# Patient Record
Sex: MALE | Race: BLACK OR AFRICAN AMERICAN | URBAN - METROPOLITAN AREA
[De-identification: names, ages, dates, MRNs, and addresses within clinical notes are randomized per-mention and may not be internally consistent; named-entity substitution may affect disease eponyms.]

---

## 2017-06-22 ENCOUNTER — FOLLOW UP ESTABLISHED (OUTPATIENT)
Dept: URBAN - METROPOLITAN AREA CLINIC 10 | Facility: CLINIC | Age: 77
End: 2017-06-22
Payer: MEDICARE

## 2017-06-22 DIAGNOSIS — H25.813 COMBINED FORMS OF AGE-RELATED CATARACT, BILATERAL: ICD-10-CM

## 2017-06-22 DIAGNOSIS — H40.1132 PRIMARY OPEN-ANGLE GLAUCOMA, MODERATE STAGE, BILATERAL: Primary | ICD-10-CM

## 2017-06-22 PROCEDURE — 92014 COMPRE OPH EXAM EST PT 1/>: CPT | Performed by: OPTOMETRIST

## 2017-06-22 PROCEDURE — 92083 EXTENDED VISUAL FIELD XM: CPT | Performed by: OPTOMETRIST

## 2017-06-22 ASSESSMENT — INTRAOCULAR PRESSURE
OD: 28
OS: 22

## 2018-08-14 ENCOUNTER — FOLLOW UP ESTABLISHED (OUTPATIENT)
Dept: URBAN - METROPOLITAN AREA CLINIC 10 | Facility: CLINIC | Age: 78
End: 2018-08-14
Payer: MEDICARE

## 2018-08-14 PROCEDURE — 92014 COMPRE OPH EXAM EST PT 1/>: CPT | Performed by: OPTOMETRIST

## 2018-08-14 RX ORDER — TIMOLOL MALEATE 5 MG/ML
0.5 % SOLUTION/ DROPS OPHTHALMIC
Qty: 1 | Refills: 11 | Status: INACTIVE
Start: 2018-08-14 | End: 2018-09-05

## 2018-08-14 ASSESSMENT — INTRAOCULAR PRESSURE
OD: 30
OS: 22

## 2018-08-14 ASSESSMENT — VISUAL ACUITY
OS: 20/30
OD: 20/30

## 2018-10-24 ENCOUNTER — OFFICE VISIT (OUTPATIENT)
Dept: URBAN - METROPOLITAN AREA CLINIC 23 | Facility: CLINIC | Age: 78
End: 2018-10-24
Payer: MEDICARE

## 2018-10-24 DIAGNOSIS — H40.1133 PRIMARY OPEN-ANGLE GLAUCOMA, BILATERAL, SEVERE STAGE: Primary | ICD-10-CM

## 2018-10-24 PROCEDURE — 76514 ECHO EXAM OF EYE THICKNESS: CPT | Performed by: OPHTHALMOLOGY

## 2018-10-24 PROCEDURE — 92004 COMPRE OPH EXAM NEW PT 1/>: CPT | Performed by: OPHTHALMOLOGY

## 2018-10-24 PROCEDURE — 92020 GONIOSCOPY: CPT | Performed by: OPHTHALMOLOGY

## 2018-10-24 PROCEDURE — 92133 CPTRZD OPH DX IMG PST SGM ON: CPT | Performed by: OPHTHALMOLOGY

## 2018-10-24 RX ORDER — NETARSUDIL 0.2 MG/ML
0.02 % SOLUTION/ DROPS OPHTHALMIC; TOPICAL
Qty: 1 | Refills: 11 | Status: INACTIVE
Start: 2018-10-24 | End: 2018-11-06

## 2018-10-24 ASSESSMENT — INTRAOCULAR PRESSURE
OS: 34
OS: 23
OD: 36
OD: 30

## 2018-10-24 NOTE — IMPRESSION/PLAN
Impression: Primary open-angle glaucoma, bilateral, severe stage: U55.4189. Trab OS-CCT average OU - ONH Enlarged cupping OU-
IOP elevated OU - medication not effective at lowering IOP - Plan: Discussed diagnosis, explained and understood by patient. Discussed IOP/ONH/Glaucoma management and risks. OCT ordered, performed and reviewed. Continue timolol bid ou. Start rhopressa qhs od, sample given. Discussed side effects of glaucoma meds. Recommends Micropulse Trans-scleral CPC OD laser if rhopressa not effective at lowering IOP. will continue to monitor condition and symptoms.

## 2019-08-29 ENCOUNTER — FOLLOW UP ESTABLISHED (OUTPATIENT)
Dept: URBAN - METROPOLITAN AREA CLINIC 10 | Facility: CLINIC | Age: 79
End: 2019-08-29
Payer: MEDICARE

## 2019-08-29 PROCEDURE — 92083 EXTENDED VISUAL FIELD XM: CPT | Performed by: OPTOMETRIST

## 2019-08-29 PROCEDURE — 92133 CPTRZD OPH DX IMG PST SGM ON: CPT | Performed by: OPTOMETRIST

## 2019-08-29 PROCEDURE — 92014 COMPRE OPH EXAM EST PT 1/>: CPT | Performed by: OPTOMETRIST

## 2019-08-29 ASSESSMENT — VISUAL ACUITY
OD: 20/40
OS: 20/30

## 2019-08-29 ASSESSMENT — INTRAOCULAR PRESSURE
OS: 22
OD: 31

## 2019-08-29 ASSESSMENT — KERATOMETRY
OD: 46.13
OS: 46.13

## 2019-11-04 ENCOUNTER — FOLLOW UP ESTABLISHED (OUTPATIENT)
Dept: URBAN - METROPOLITAN AREA CLINIC 10 | Facility: CLINIC | Age: 79
End: 2019-11-04
Payer: MEDICARE

## 2019-11-04 PROCEDURE — 92014 COMPRE OPH EXAM EST PT 1/>: CPT | Performed by: OPHTHALMOLOGY

## 2019-11-04 PROCEDURE — 92083 EXTENDED VISUAL FIELD XM: CPT | Performed by: OPHTHALMOLOGY

## 2019-11-04 PROCEDURE — 76514 ECHO EXAM OF EYE THICKNESS: CPT | Performed by: OPHTHALMOLOGY

## 2019-11-04 ASSESSMENT — INTRAOCULAR PRESSURE
OD: 36
OS: 26

## 2019-11-18 ENCOUNTER — Encounter (OUTPATIENT)
Dept: URBAN - METROPOLITAN AREA CLINIC 10 | Facility: CLINIC | Age: 79
End: 2019-11-18
Payer: MEDICARE

## 2019-11-18 PROCEDURE — 65855 TRABECULOPLASTY LASER SURG: CPT | Performed by: OPHTHALMOLOGY

## 2019-12-02 ENCOUNTER — Encounter (OUTPATIENT)
Dept: URBAN - METROPOLITAN AREA CLINIC 10 | Facility: CLINIC | Age: 79
End: 2019-12-02
Payer: MEDICARE

## 2019-12-02 PROCEDURE — 65855 TRABECULOPLASTY LASER SURG: CPT | Performed by: OPHTHALMOLOGY

## 2019-12-02 ASSESSMENT — INTRAOCULAR PRESSURE: OS: 32

## 2019-12-30 ENCOUNTER — FOLLOW UP ESTABLISHED (OUTPATIENT)
Dept: URBAN - METROPOLITAN AREA CLINIC 10 | Facility: CLINIC | Age: 79
End: 2019-12-30
Payer: MEDICARE

## 2019-12-30 PROCEDURE — 92012 INTRM OPH EXAM EST PATIENT: CPT | Performed by: OPHTHALMOLOGY

## 2019-12-30 ASSESSMENT — INTRAOCULAR PRESSURE
OD: 27
OS: 26

## 2020-01-27 ENCOUNTER — Encounter (OUTPATIENT)
Dept: URBAN - METROPOLITAN AREA CLINIC 10 | Facility: CLINIC | Age: 80
End: 2020-01-27
Payer: MEDICARE

## 2020-01-27 PROCEDURE — 65855 TRABECULOPLASTY LASER SURG: CPT | Performed by: OPHTHALMOLOGY

## 2020-02-03 ENCOUNTER — Encounter (OUTPATIENT)
Dept: URBAN - METROPOLITAN AREA CLINIC 10 | Facility: CLINIC | Age: 80
End: 2020-02-03
Payer: MEDICARE

## 2020-02-03 PROCEDURE — 65855 TRABECULOPLASTY LASER SURG: CPT | Performed by: OPHTHALMOLOGY

## 2020-02-24 ENCOUNTER — FOLLOW UP ESTABLISHED (OUTPATIENT)
Dept: URBAN - METROPOLITAN AREA CLINIC 10 | Facility: CLINIC | Age: 80
End: 2020-02-24
Payer: MEDICARE

## 2020-02-24 PROCEDURE — 92012 INTRM OPH EXAM EST PATIENT: CPT | Performed by: OPTOMETRIST

## 2020-02-24 RX ORDER — NEOMYCIN SULFATE, POLYMYXIN B SULFATE AND DEXAMETHASONE 3.5; 10000; 1 MG/ML; [USP'U]/ML; MG/ML
SUSPENSION OPHTHALMIC
Qty: 1 | Refills: 1 | Status: INACTIVE
Start: 2020-02-24 | End: 2020-08-10

## 2020-02-24 ASSESSMENT — INTRAOCULAR PRESSURE: OD: 17

## 2020-03-03 ENCOUNTER — FOLLOW UP ESTABLISHED (OUTPATIENT)
Dept: URBAN - METROPOLITAN AREA CLINIC 10 | Facility: CLINIC | Age: 80
End: 2020-03-03
Payer: MEDICARE

## 2020-03-03 DIAGNOSIS — B00.59 OTHER HERPESVIRAL DISEASE OF EYE: ICD-10-CM

## 2020-03-03 PROCEDURE — 92012 INTRM OPH EXAM EST PATIENT: CPT | Performed by: OPTOMETRIST

## 2020-03-03 ASSESSMENT — INTRAOCULAR PRESSURE
OD: 26
OS: 26
OS: 21
OD: 23

## 2020-03-23 ENCOUNTER — FOLLOW UP ESTABLISHED (OUTPATIENT)
Dept: URBAN - METROPOLITAN AREA CLINIC 10 | Facility: CLINIC | Age: 80
End: 2020-03-23
Payer: MEDICARE

## 2020-03-23 PROCEDURE — 92014 COMPRE OPH EXAM EST PT 1/>: CPT | Performed by: OPHTHALMOLOGY

## 2020-03-23 ASSESSMENT — INTRAOCULAR PRESSURE
OS: 17
OD: 28

## 2020-04-20 ENCOUNTER — FOLLOW UP ESTABLISHED (OUTPATIENT)
Dept: URBAN - METROPOLITAN AREA CLINIC 10 | Facility: CLINIC | Age: 80
End: 2020-04-20
Payer: MEDICARE

## 2020-04-20 PROCEDURE — 92012 INTRM OPH EXAM EST PATIENT: CPT | Performed by: OPHTHALMOLOGY

## 2020-04-20 ASSESSMENT — INTRAOCULAR PRESSURE
OD: 33
OS: 19

## 2020-05-18 ENCOUNTER — FOLLOW UP ESTABLISHED (OUTPATIENT)
Dept: URBAN - METROPOLITAN AREA CLINIC 10 | Facility: CLINIC | Age: 80
End: 2020-05-18
Payer: MEDICARE

## 2020-05-18 PROCEDURE — 92012 INTRM OPH EXAM EST PATIENT: CPT | Performed by: OPHTHALMOLOGY

## 2020-05-18 RX ORDER — BRINZOLAMIDE/BRIMONIDINE TARTRATE 10; 2 MG/ML; MG/ML
SUSPENSION/ DROPS OPHTHALMIC
Qty: 0 | Refills: 0 | Status: INACTIVE
Start: 2020-05-18 | End: 2020-05-19

## 2020-05-18 ASSESSMENT — INTRAOCULAR PRESSURE
OD: 17
OS: 17

## 2020-08-10 ENCOUNTER — FOLLOW UP ESTABLISHED (OUTPATIENT)
Dept: URBAN - METROPOLITAN AREA CLINIC 10 | Facility: CLINIC | Age: 80
End: 2020-08-10
Payer: MEDICARE

## 2020-08-10 PROCEDURE — 92012 INTRM OPH EXAM EST PATIENT: CPT | Performed by: OPHTHALMOLOGY

## 2020-08-10 RX ORDER — DORZOLAMIDE HCL 20 MG/ML
2 % SOLUTION/ DROPS OPHTHALMIC
Qty: 3 | Refills: 3 | Status: INACTIVE
Start: 2020-08-10 | End: 2020-11-30

## 2020-08-10 RX ORDER — TIMOLOL MALEATE 5 MG/ML
0.5 % SOLUTION/ DROPS OPHTHALMIC
Qty: 3 | Refills: 3 | Status: INACTIVE
Start: 2020-08-10 | End: 2021-03-22

## 2020-08-10 RX ORDER — BRIMONIDINE TARTRATE 2 MG/ML
0.2 % SOLUTION/ DROPS OPHTHALMIC
Qty: 3 | Refills: 3 | Status: INACTIVE
Start: 2020-08-10 | End: 2020-11-30

## 2020-08-10 ASSESSMENT — VISUAL ACUITY
OD: 20/30
OS: 20/40

## 2020-08-10 ASSESSMENT — INTRAOCULAR PRESSURE
OD: 20
OS: 17

## 2020-10-05 ENCOUNTER — FOLLOW UP ESTABLISHED (OUTPATIENT)
Dept: URBAN - METROPOLITAN AREA CLINIC 10 | Facility: CLINIC | Age: 80
End: 2020-10-05
Payer: MEDICARE

## 2020-10-05 PROCEDURE — 92012 INTRM OPH EXAM EST PATIENT: CPT | Performed by: OPHTHALMOLOGY

## 2020-10-05 ASSESSMENT — INTRAOCULAR PRESSURE
OD: 25
OS: 20

## 2020-11-30 ENCOUNTER — FOLLOW UP ESTABLISHED (OUTPATIENT)
Dept: URBAN - METROPOLITAN AREA CLINIC 10 | Facility: CLINIC | Age: 80
End: 2020-11-30
Payer: MEDICARE

## 2020-11-30 PROCEDURE — 92012 INTRM OPH EXAM EST PATIENT: CPT | Performed by: OPHTHALMOLOGY

## 2020-11-30 RX ORDER — BRINZOLAMIDE/BRIMONIDINE TARTRATE 10; 2 MG/ML; MG/ML
SUSPENSION/ DROPS OPHTHALMIC
Qty: 15 | Refills: 0 | Status: INACTIVE
Start: 2020-11-30 | End: 2020-11-30

## 2020-11-30 ASSESSMENT — INTRAOCULAR PRESSURE
OD: 20
OS: 21

## 2021-03-22 ENCOUNTER — FOLLOW UP ESTABLISHED (OUTPATIENT)
Dept: URBAN - METROPOLITAN AREA CLINIC 10 | Facility: CLINIC | Age: 81
End: 2021-03-22
Payer: MEDICARE

## 2021-03-22 PROCEDURE — 99213 OFFICE O/P EST LOW 20 MIN: CPT | Performed by: OPHTHALMOLOGY

## 2021-03-22 RX ORDER — BRINZOLAMIDE/BRIMONIDINE TARTRATE 10; 2 MG/ML; MG/ML
SUSPENSION/ DROPS OPHTHALMIC
Qty: 16 | Refills: 3 | Status: ACTIVE
Start: 2021-03-22

## 2021-03-22 RX ORDER — TIMOLOL MALEATE 5 MG/ML
0.5 % SOLUTION/ DROPS OPHTHALMIC
Qty: 15 | Refills: 3 | Status: INACTIVE
Start: 2021-03-22 | End: 2022-03-25

## 2021-03-22 ASSESSMENT — INTRAOCULAR PRESSURE
OS: 19
OD: 23

## 2021-05-28 ENCOUNTER — OFFICE VISIT (OUTPATIENT)
Dept: URBAN - METROPOLITAN AREA CLINIC 10 | Facility: CLINIC | Age: 81
End: 2021-05-28
Payer: MEDICARE

## 2021-05-28 PROCEDURE — 99213 OFFICE O/P EST LOW 20 MIN: CPT | Performed by: OPHTHALMOLOGY

## 2021-05-28 ASSESSMENT — INTRAOCULAR PRESSURE
OS: 20
OD: 24

## 2021-05-28 NOTE — IMPRESSION/PLAN
Impression: Primary open-angle glaucoma, moderate stage, bilateral
s/p SLT OD on 11/18/19 and OS on 12/02/19
steroid Response 2/2 Maxitrol due to Shingles Previously used medications : Rhopressa - Intolerance;  Brimonidine + Dorzolamide - tried but had side effects of dizziness Plan: Pt has Glaucoma    Gonio :  ss 2-3+ pig OU   Pachs:  560/540   Today's IOP :  24, 20  Tmax :  36,34 Target IOP low to mid teens
not surgical candidate* Pt denies Fhx of Glaucoma Right eye is the better seeing eye  
11/4/19 HVF ( bilateral inferior jennifer fields )
C/D: 0.65x0.65 thin superior temporal  /0.95x0.9 loss of inferior and temporal 
Pt denies Sulfa Allergy   // Pt confirms Heart dx Plan :
1. Cont:
Timolol QAM OU Simbrinza QHS OU Pt states he tolerated Simbrinza but not generics. Patient has failed all other combinations of drops. IOP is not controlled with just one medication. Will need to do  prior auth for [address]. 2.  Explained that current condition and IOP stable with current medication. Careful observation was discussed and is strongly recommended to prevent possible future vision loss. Will continue to monitor interocular pressure and testing in clinic at regular intervals. No additional treatment is being implemented at this time. Pt has deferred surgical intervention 11/30/2020-05/28/2021 3.  Return in 3 months for [facility] (tech to check IOP)

## 2021-05-28 NOTE — IMPRESSION/PLAN
Impression: Combined forms of age-related cataract, bilateral Plan: Due to health issues recommend holding for now. Pt defers MRx.

## 2021-11-12 ENCOUNTER — OFFICE VISIT (OUTPATIENT)
Dept: URBAN - METROPOLITAN AREA CLINIC 10 | Facility: CLINIC | Age: 81
End: 2021-11-12
Payer: MEDICARE

## 2021-11-12 PROCEDURE — 99213 OFFICE O/P EST LOW 20 MIN: CPT | Performed by: OPHTHALMOLOGY

## 2021-11-12 ASSESSMENT — INTRAOCULAR PRESSURE
OS: 22
OD: 22

## 2021-11-12 NOTE — IMPRESSION/PLAN
Impression: Primary open-angle glaucoma, moderate stage, bilateral
s/p SLT OD on 11/18/19 and OS on 12/02/19
steroid Response 2/2 Maxitrol due to Shingles Previously used medications : Rhopressa - Intolerance;  Brimonidine + Dorzolamide - tried but had side effects of dizziness Plan: Pt has Glaucoma    Gonio :  ss 2-3+ pig OU   Pachs:  560/540   Today's IOP :  22 OU  Tmax :  36,34 Target IOP low to mid teens
not surgical candidate* Pt denies Fhx of Glaucoma Right eye is the better seeing eye  
11/4/19 HVF ( bilateral inferior jennifer fields )
C/D: 0.65x0.65 thin superior temporal  /0.95x0.9 loss of inferior and temporal 
Pt denies Sulfa Allergy   // Pt confirms Heart dx Plan :
1. Cont:
Timolol QAM OU Simbrinza QHS OU 2. Explained that current condition and IOP stable with current medication. Careful observation was discussed and is strongly recommended to prevent possible future vision loss. Will continue to monitor interocular pressure and testing in clinic at regular intervals. No additional treatment is being implemented at this time. Pt has deferred surgical intervention 11/30/2020-05/28/2021 3.  Return in 3-4 months for IOP check

## 2022-03-04 ENCOUNTER — OFFICE VISIT (OUTPATIENT)
Dept: URBAN - METROPOLITAN AREA CLINIC 10 | Facility: CLINIC | Age: 82
End: 2022-03-04
Payer: MEDICARE

## 2022-03-04 PROCEDURE — 99213 OFFICE O/P EST LOW 20 MIN: CPT | Performed by: OPHTHALMOLOGY

## 2022-03-04 ASSESSMENT — INTRAOCULAR PRESSURE
OD: 29
OS: 20

## 2022-03-04 NOTE — IMPRESSION/PLAN
Impression: Primary open-angle glaucoma, moderate stage, bilateral
s/p SLT OD on 11/18/19 and OS on 12/02/19
steroid Response 2/2 Maxitrol due to Shingles Previously used medications : Rhopressa - Intolerance;  Brimonidine + Dorzolamide - tried but had side effects of dizziness Plan: Pt has Glaucoma    Gonio :  ss 2-3+ pig OU   Pachs:  560/540   Today's IOP :  29/20   Tmax :  36,34 Target IOP low to mid teens
not surgical candidate* Pt denies Fhx of Glaucoma Right eye is the better seeing eye  
11/4/19 HVF ( bilateral inferior jennifer fields )
C/D: 0.65x0.65 thin superior temporal  /0.95x0.9 loss of inferior and temporal 
Pt denies Sulfa Allergy // Pt confirms Heart dx Plan :
1. Continue:
Timolol QAM OU Simbrinza QHS OU 2. Explained that current condition and IOP stable with current medication. Careful observation was discussed and is strongly recommended to prevent possible future vision loss. Will continue to monitor interocular pressure and testing in clinic at regular intervals. No additional treatment is being implemented at this time 2/2 deferred additional intervention. Pt has deferred surgical intervention 11/30/2020-03/04/22 3. Return in 1 months for IOP check for MRX with optom and 4 months with Dr. Elaine Ramírez with [facility]
4. Pt to call sooner if he is cleared for surgical intervention and wants to have it performed Would plan for cat/OMNI/HYdrus OU

## 2022-06-24 ENCOUNTER — OFFICE VISIT (OUTPATIENT)
Dept: URBAN - METROPOLITAN AREA CLINIC 10 | Facility: CLINIC | Age: 82
End: 2022-06-24
Payer: MEDICARE

## 2022-06-24 DIAGNOSIS — H25.813 COMBINED FORMS OF AGE-RELATED CATARACT, BILATERAL: ICD-10-CM

## 2022-06-24 DIAGNOSIS — H40.1132 PRIMARY OPEN-ANGLE GLAUCOMA, BILATERAL, MODERATE STAGE: Primary | ICD-10-CM

## 2022-06-24 PROCEDURE — 99213 OFFICE O/P EST LOW 20 MIN: CPT | Performed by: OPHTHALMOLOGY

## 2022-06-24 ASSESSMENT — INTRAOCULAR PRESSURE
OS: 21
OD: 28

## 2022-06-24 NOTE — IMPRESSION/PLAN
Impression: Primary open-angle glaucoma, moderate stage, bilateral
s/p SLT OD on 11/18/19 and OS on 12/02/19
steroid Response 2/2 Maxitrol due to Shingles Previously used medications : Rhopressa - Intolerance;  Brimonidine + Dorzolamide - tried but had side effects of dizziness Plan: Pt has Glaucoma    Gonio :  ss 2-3+ pig OU   Pachs:  560/540   Today's IOP :  28/21   Tmax :  36,34 Target IOP low to mid teens
not surgical candidate* Pt denies Fhx of Glaucoma Right eye is the better seeing eye  
11/4/19 HVF ( bilateral inferior jennifer fields )
C/D: 0.65x0.65 thin superior temporal  /0.95x0.9 loss of inferior and temporal 
Pt denies Sulfa Allergy // Pt confirms Heart dx Plan :
1. Continue:
Timolol BID OU Simbrinza BID OU 2. Explained that current condition and IOP stable with current medication. Careful observation was discussed and is strongly recommended to prevent possible future vision loss. Will continue to monitor interocular pressure and testing in clinic at regular intervals. No additional treatment is being implemented at this time 2/2 deferred additional intervention. Pt has deferred surgical intervention 11/30/2020-6/24/22 3. Return in 4 months for CE &IOP check for MRX with [facility].  Med management (Return to Dr. Carmen Su if surgical intervention is desired)

## 2022-06-24 NOTE — IMPRESSION/PLAN
Impression: Combined forms of age-related cataract, bilateral Plan: Due to health issues recommend holding for now.  Pt defers cataract surgery

## 2023-04-20 ENCOUNTER — OFFICE VISIT (OUTPATIENT)
Dept: URBAN - METROPOLITAN AREA CLINIC 10 | Facility: CLINIC | Age: 83
End: 2023-04-20
Payer: MEDICARE

## 2023-04-20 DIAGNOSIS — H25.813 COMBINED FORMS OF AGE-RELATED CATARACT, BILATERAL: ICD-10-CM

## 2023-04-20 DIAGNOSIS — H43.393 OTHER VITREOUS OPACITIES, BILATERAL: ICD-10-CM

## 2023-04-20 DIAGNOSIS — H40.1132 PRIMARY OPEN-ANGLE GLAUCOMA, BILATERAL, MODERATE STAGE: Primary | ICD-10-CM

## 2023-04-20 PROCEDURE — 92133 CPTRZD OPH DX IMG PST SGM ON: CPT | Performed by: OPTOMETRIST

## 2023-04-20 PROCEDURE — 99204 OFFICE O/P NEW MOD 45 MIN: CPT | Performed by: OPTOMETRIST

## 2023-04-20 PROCEDURE — 92134 CPTRZ OPH DX IMG PST SGM RTA: CPT | Performed by: OPTOMETRIST

## 2023-04-20 ASSESSMENT — VISUAL ACUITY
OS: 20/60
OD: 20/100

## 2023-04-20 ASSESSMENT — INTRAOCULAR PRESSURE
OS: 21
OD: 35
OD: 30
OS: 30

## 2023-04-20 NOTE — IMPRESSION/PLAN
Impression: Other vitreous opacities, bilateral: H43.393. Plan: Discussed findings in detail. Discussed signs and symptoms of RD and RTC STAT if noticed. Asteroid hyalosis OS.

## 2023-04-20 NOTE — IMPRESSION/PLAN
Impression: Combined forms of age-related cataract, bilateral Plan: Due to health issues recommend holding for now. Pt defers cataract surgery Understands no improvement c glasses. Pt. will discuss c spouse.

## 2023-04-20 NOTE — IMPRESSION/PLAN
Impression: Primary open-angle glaucoma, moderate stage, bilateral
s/p SLT OD on 11/18/19 and OS on 12/02/19
s/p trab OS
steroid Response 2/2 Maxitrol due to Shingles Previously used medications : Rhopressa - Intolerance;  Brimonidine + Dorzolamide - tried but had side effects of dizziness Plan: Pt has Glaucoma    Gonio :  ss 2-3+ pig OU   Pachs:  560/540    Tmax :  36,34 Target IOP low to mid teens
not surgical candidate* Pt denies Fhx of Glaucoma Right eye is the better seeing eye  
11/4/19 HVF ( bilateral inferior jennifer fields )
C/D: 0.65x0.65 thin superior temporal  /0.95x0.9 loss of inferior and temporal 
Pt denies Sulfa Allergy // Pt confirms Heart dx Plan :
1. IOP sub-optimal OD>OS. Pt. is not using simbrinza OD so will restart. Cont. Timolol BID OU Restart Simbrinza BID OD and cont. bid OS. 2. Pt. continues to defer additional intervention. Pt has deferred surgical intervention 11/30/2020-4/20/23 3. RTC 1 month for IOP check.

## 2023-05-25 ENCOUNTER — OFFICE VISIT (OUTPATIENT)
Dept: URBAN - METROPOLITAN AREA CLINIC 10 | Facility: CLINIC | Age: 83
End: 2023-05-25
Payer: MEDICARE

## 2023-05-25 DIAGNOSIS — H40.1132 PRIMARY OPEN-ANGLE GLAUCOMA, BILATERAL, MODERATE STAGE: Primary | ICD-10-CM

## 2023-05-25 DIAGNOSIS — H25.813 COMBINED FORMS OF AGE-RELATED CATARACT, BILATERAL: ICD-10-CM

## 2023-05-25 PROCEDURE — 99213 OFFICE O/P EST LOW 20 MIN: CPT | Performed by: OPTOMETRIST

## 2023-05-25 ASSESSMENT — INTRAOCULAR PRESSURE
OS: 21
OS: 20
OD: 22
OD: 29

## 2023-05-25 NOTE — IMPRESSION/PLAN
Impression: Primary open-angle glaucoma, moderate stage, bilateral
s/p SLT OD on 11/18/19 and OS on 12/02/19
s/p trab OS
steroid Response 2/2 Maxitrol due to Shingles Previously used medications : Rhopressa - Intolerance;  Brimonidine + Dorzolamide - tried but had side effects of dizziness
intolerant to PGAs Plan: Pt has Glaucoma    Gonio :  ss 2-3+ pig OU   Pachs:  560/540    Tmax :  36,34 Target IOP low to mid teens
not surgical candidate* Pt denies Fhx of Glaucoma Right eye is the better seeing eye  
11/4/19 HVF ( bilateral inferior jennifer fields )
C/D: 0.65x0.65 thin superior temporal  /0.95x0.9 loss of inferior and temporal 
Pt denies Sulfa Allergy // Pt confirms Heart dx Plan :
1. IOP remains sub-optimal OU Cont. simbrinza bid OU. Cont. Timolol BID OU Intolerant to all other gtts. 2. Pt. continues to defer additional intervention. Pt has deferred surgical intervention 11/30/2020-4/20/23. Discussed c Dr. Lakshmi Blount in the past.
3. Possible repeat SLT.   Will consult Dr. Lexi Huynh 1 month

## 2023-06-23 ENCOUNTER — OFFICE VISIT (OUTPATIENT)
Dept: URBAN - METROPOLITAN AREA CLINIC 10 | Facility: CLINIC | Age: 83
End: 2023-06-23
Payer: MEDICARE

## 2023-06-23 DIAGNOSIS — H16.223 KERATOCONJUNCTIVITIS SICCA, BILATERAL: ICD-10-CM

## 2023-06-23 DIAGNOSIS — H40.1133 PRIMARY OPEN-ANGLE GLAUCOMA, SEVERE STAGE, BILATERAL: Primary | ICD-10-CM

## 2023-06-23 DIAGNOSIS — H25.813 COMBINED FORMS OF AGE-RELATED CATARACT, BILATERAL: ICD-10-CM

## 2023-06-23 PROCEDURE — 92020 GONIOSCOPY: CPT | Performed by: STUDENT IN AN ORGANIZED HEALTH CARE EDUCATION/TRAINING PROGRAM

## 2023-06-23 PROCEDURE — 99214 OFFICE O/P EST MOD 30 MIN: CPT | Performed by: STUDENT IN AN ORGANIZED HEALTH CARE EDUCATION/TRAINING PROGRAM

## 2023-06-23 ASSESSMENT — INTRAOCULAR PRESSURE
OD: 30
OS: 22

## 2023-06-23 NOTE — IMPRESSION/PLAN
Impression: Primary open-angle glaucoma, severe stage, bilateral: H40.1133. *Steroid responder* Hx of multiple concussions Intolerance to Rho & PGA's Plan: Ocular Surgical History: s/p SLT OU, Trab OS Pachy: 560/540 Tmax:  36/34 Target IOP: low to mid teens OU Family Hx/ Heart / Lung / Kidney disease/sulfa allergy: Pt. denies Gonioscopy: OD: grade 3/ OS: grade 4 ; 2+ TMP OU
OCT: 21/43 diffuse thinning, poor capture, artifact ou HVF: (2019) OD: MD -12 inf arc to fix, sup arc/OS: MD -10 inf arc to fix, sup arc/step C/D: PENDING (poor view undilated) Better seeing eye: OS
IOP Today: 30/22 Plan: IOP is too high for the level of glaucomatous damage at this time. Recommend lowering IOP. Discussed Phaco/Migs as patient is on all tolerable drops. Drops: CONTINUE Simbrinza BID OU, and Timolol BID OU Discussed natural history of glaucoma and that irreversible vision loss can occur without adequate IOP control. Patient advised to use drops as directed, EVEN on mornings of appointments. Poor compliance can lead to blindness.

## 2023-06-23 NOTE — IMPRESSION/PLAN
Impression: Combined forms of age-related cataract, bilateral: H25.813. Plan: Patient will need EDU/ASCAN ([place] if AT is wanted)/ H&P (BDP only), Dilated PREOP (tech to update mrx/glare if needed, check IOP and dilate); Phaco/Omni OU **ALL PO's with and OD**

## 2023-06-23 NOTE — IMPRESSION/PLAN
Impression: Keratoconjunctivitis sicca, bilateral: U39.459. Plan: Patient to use AFT OU for dryness and irritation.

## 2023-07-19 ENCOUNTER — OFFICE VISIT (OUTPATIENT)
Dept: URBAN - METROPOLITAN AREA CLINIC 10 | Facility: CLINIC | Age: 83
End: 2023-07-19
Payer: MEDICARE

## 2023-07-19 DIAGNOSIS — H40.1133 PRIMARY OPEN-ANGLE GLAUCOMA, BILATERAL, SEVERE STAGE: ICD-10-CM

## 2023-07-19 DIAGNOSIS — Z01.818 ENCOUNTER FOR OTHER PREPROCEDURAL EXAMINATION: Primary | ICD-10-CM

## 2023-07-19 DIAGNOSIS — H25.813 COMBINED FORMS OF AGE-RELATED CATARACT, BILATERAL: Primary | ICD-10-CM

## 2023-07-19 PROCEDURE — 99203 OFFICE O/P NEW LOW 30 MIN: CPT | Performed by: PHYSICIAN ASSISTANT

## 2023-07-21 ENCOUNTER — OFFICE VISIT (OUTPATIENT)
Dept: URBAN - METROPOLITAN AREA CLINIC 10 | Facility: CLINIC | Age: 83
End: 2023-07-21
Payer: MEDICARE

## 2023-07-21 DIAGNOSIS — H43.22 CRYSTALLINE DEPOSITS IN VITREOUS BODY, LEFT EYE: ICD-10-CM

## 2023-07-21 DIAGNOSIS — H43.813 VITREOUS DEGENERATION, BILATERAL: ICD-10-CM

## 2023-07-21 DIAGNOSIS — H16.223 KERATOCONJUNCTIVITIS SICCA, BILATERAL: ICD-10-CM

## 2023-07-21 DIAGNOSIS — H52.203 BILATERAL ASTIGMATISM: ICD-10-CM

## 2023-07-21 PROCEDURE — 99214 OFFICE O/P EST MOD 30 MIN: CPT | Performed by: STUDENT IN AN ORGANIZED HEALTH CARE EDUCATION/TRAINING PROGRAM

## 2023-07-21 PROCEDURE — 92136 OPHTHALMIC BIOMETRY: CPT | Performed by: STUDENT IN AN ORGANIZED HEALTH CARE EDUCATION/TRAINING PROGRAM

## 2023-07-21 RX ORDER — PREDNISOLONE ACETATE 10 MG/ML
1 % SUSPENSION/ DROPS OPHTHALMIC
Qty: 10 | Refills: 1 | Status: ACTIVE
Start: 2023-07-21

## 2023-07-21 RX ORDER — OFLOXACIN 3 MG/ML
0.3 % SOLUTION/ DROPS OPHTHALMIC
Qty: 5 | Refills: 1 | Status: ACTIVE
Start: 2023-07-21

## 2023-07-21 RX ORDER — TIMOLOL MALEATE 5 MG/ML
0.5 % SOLUTION/ DROPS OPHTHALMIC
Qty: 15 | Refills: 6 | Status: ACTIVE
Start: 2023-07-21

## 2023-07-21 RX ORDER — BRINZOLAMIDE/BRIMONIDINE TARTRATE 10; 2 MG/ML; MG/ML
SUSPENSION/ DROPS OPHTHALMIC
Qty: 16 | Refills: 5 | Status: ACTIVE
Start: 2023-07-21

## 2023-07-21 ASSESSMENT — VISUAL ACUITY
OS: 20/30
OD: 20/40

## 2023-07-21 ASSESSMENT — INTRAOCULAR PRESSURE
OS: 21
OD: 24

## 2023-07-21 NOTE — IMPRESSION/PLAN
Impression: Keratoconjunctivitis sicca, bilateral: G31.590. Plan: Patient to use AFT OU for dryness and irritation. Patient understands condition may limit possible outcome of surgery.

## 2023-07-21 NOTE — IMPRESSION/PLAN
Impression: Crystalline deposits in vitreous body, left eye: H43.22. Plan: Asteroid Hyalosis OS. Patient understands condition may limit possible outcome of surgery.

## 2023-07-21 NOTE — IMPRESSION/PLAN
Impression: Combined forms of age-related cataract, bilateral: H25.813. Plan: Both eyes examined today and discussed cataract and glaucoma diagnoses in detail with patient. Advised patient of treatment options as well as lens options. Surgery is medically necessary due to impact in activities of daily living. Discussed risks, benefits, and expectations of cataract & MIGS surgery. Risks include bleeding, infection, loss of vision, loss of the eye, need for additional surgery to remove or exchange lens, dysphotopsias, macular/corneal edema. Patient also understands glasses will be necessary for sharpest vision after surgery. Patient understands that MIGS are an attempt to lower the IOP and protect the eye from glaucoma damage. Glaucoma will still be present and can continue to worsen without proper followup care and eye drop use. Patient wishes to proceed with Cataract/MIGS surgery. Discussed activity restrictions: no bending head below waist, rubbing the eye, no swimming straining or lifting over 10 lb. All questions answered. Patient would like to proceed with CE/MIGS OU. Okay to proceed with the second eye once the first eye is cleared. Optical biomtery and appropriate testing reviewed. Plan:
Phaco/Omni OU, OD 1st eye,  [[Aim: -2.00 OU. IOL: monofocal. No LRI; patient declines AMP/ORA. Drops only, Use Pred & Oflox QID -Need Malyugiun ring and Shugarcaine (tamsulosin) ; [facility]]] ***Clearance - Eliquis (ok to proceed w/ sx if no clearance to d/c blood thinners; patient understands higher risk of bleeding) ERx'd Ofloxacin and Prednisolone QID.

## 2023-07-21 NOTE — IMPRESSION/PLAN
Impression: Primary open-angle glaucoma, severe stage, bilateral: H40.1133. *Steroid responder* Hx of multiple concussions Intolerance to Rho & PGA's Plan: Ocular Surgical History: s/p SLT OU, Trab OS Pachy: 560/540 Tmax:  36/34 Target IOP: low to mid teens OU Family Hx/ Heart / Lung / Kidney disease/sulfa allergy: Pt. denies Gonioscopy: OD: grade 3/ OS: grade 4 ; 2+ TMP OU
OCT: 21/43 diffuse thinning, poor capture, artifact ou HVF: (2019) OD: MD -12 inf arc to fix, sup arc/OS: MD -10 inf arc to fix, sup arc/step C/D: .8/.9 Better seeing eye: OS
IOP Today: 24/21 Plan: **WIFE PRESENT DURING EXAM** IOP is still too high for the level of glaucomatous damage. Recommend proceeding with Phaco/Omni OU. Drops: CONTINUE Simbrinza BID OU, and Timolol BID OU Discussed natural history of glaucoma and that irreversible vision loss can occur without adequate IOP control. Patient advised to use drops as directed, EVEN on mornings of appointments. Poor compliance can lead to blindness. Patient understands condition may limit possible outcome of surgery.